# Patient Record
Sex: MALE | Race: WHITE | NOT HISPANIC OR LATINO | URBAN - METROPOLITAN AREA
[De-identification: names, ages, dates, MRNs, and addresses within clinical notes are randomized per-mention and may not be internally consistent; named-entity substitution may affect disease eponyms.]

---
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
## 2024-03-23 NOTE — ED BEHAVIORAL HEALTH ASSESSMENT NOTE - GROOMING
You can access the FollowMyHealth Patient Portal offered by [facility] by registering at the following website: [email or web address]. By joining [employer]’s FollowMyHealth portal, you will also be able to view your health information using other applications (apps) compatible with our system. Fair

## 2024-04-02 ENCOUNTER — APPOINTMENT (OUTPATIENT)
Dept: AFTER HOURS CARE | Facility: EMERGENCY ROOM | Age: 19
End: 2024-04-02
Payer: COMMERCIAL

## 2024-04-02 DIAGNOSIS — R45.1 RESTLESSNESS AND AGITATION: ICD-10-CM

## 2024-04-02 PROCEDURE — 99215 OFFICE O/P EST HI 40 MIN: CPT

## 2024-04-02 NOTE — HISTORY OF PRESENT ILLNESS
[Home] : at home, [unfilled] , at the time of the visit. [Verbal consent obtained from patient] : the patient, [unfilled] [Other Location: e.g. Home (Enter Location, City,State)___] : at [unfilled] [FreeTextEntry8] : 18 year old M hx anxiety being tapered off BZD (Ativan 1mg BID), last dose this AM, now p/w hieghtened anxiety, restlessness, inability to sit still, nausea, sweating which are all escalating over the course of the past 6hrs. No pain of any kind.

## 2024-04-02 NOTE — PLAN
[By Private Vehicle] : Sent to Emergency Department by private vehicle [FreeTextEntry1] : referred to ED, refused EMS, has capacity to do so

## 2024-04-02 NOTE — PHYSICAL EXAM
[de-identified] : GENERAL: no acute distress, well-hydrated, well-appearing, nontoxic HEAD: normocephalic EYES: 5mm b/l, EOMI, no scleral icterus, gaze conjugate NECK: trachea midline, Full ROM RESP: no resp distress, no supraclav rtrx, no stridor, normal 1:E ratio ABD: nondistended MSK: no gross deformity NEURO: alert & fully oriented SKIN: no rash PSYCH: cooperative, hieghtened affect, fast speech,
